# Patient Record
Sex: FEMALE | Race: BLACK OR AFRICAN AMERICAN | NOT HISPANIC OR LATINO | Employment: UNEMPLOYED | URBAN - METROPOLITAN AREA
[De-identification: names, ages, dates, MRNs, and addresses within clinical notes are randomized per-mention and may not be internally consistent; named-entity substitution may affect disease eponyms.]

---

## 2024-09-02 NOTE — PROGRESS NOTES
[facility] Clinic Note    CHIEF COMPLAINT:  Chief Complaint   Patient presents with    Establish Care     C/o lump on RIGHT arm, painful only when lifting something heavy       HISTORY OF  PRESENT ILLNESS:  Farzaneh Root is a 24 y.o. female who presents to clinic today to establish primary care. She recently moved from [place] with her  and 3 kids who are also established at [facility].   LMP - 8/8/2024. Regular. Lasts for 7 days.  Empties menstrual cup 3-4 times daily.   Sexually active with . Uses protection occasionally. Not interested in contraceptive methods at this time.       PMH - Asthma (well controlled, no recent flares)  PSH - Hernia surgery at age 3/4, C- section 2 years ago  FH - HTN in mom and grandparents. Lung cancer in aunt.   Social history -  with 3 kids. Works at [employer]. Denies smoking or recreational drug use. Drinks alcohol socially.  Medications - none   NKDA      Acute issues:  1) Lump on right forearm  First noticed about 2 years ago  Reports that it has grown in size since initially noticed  Painful whenever she picks her children up with that arm.   Denies any fever, chills, N/V, discharge from area      Health Maintenance: Requesting medical records from previous PCP  Cervical Cancer Screen: Pt reports done a year ago  Immunizations:         History reviewed. No pertinent past medical history. History reviewed. No pertinent surgical history.   Social History     Socioeconomic History    Marital status:    Tobacco Use    Smoking status: Never    Smokeless tobacco: Never     Social Determinants of Health     Financial Resource Strain: Medium Risk (9/2/2024)    Overall Financial Resource Strain (CARDIA)     Difficulty of Paying Living Expenses: Somewhat hard   Food Insecurity: Food Insecurity Present (9/2/2024)    Hunger Vital Sign     Worried About Running Out of Food in the Last Year: Sometimes true     Ran Out of Food in the Last Year: Patient  "declined   Physical Activity: Sufficiently Active (9/2/2024)    Exercise Vital Sign     Days of Exercise per Week: 7 days     Minutes of Exercise per Session: 60 min   Stress: Stress Concern Present (9/2/2024)    [employer] [place] [employer] - Occupational Stress Questionnaire     Feeling of Stress : Very much   Housing Stability: Unknown (9/2/2024)    Housing Stability Vital Sign     Unable to Pay for Housing in the Last Year: No         PHYSICAL EXAMINATION:  Blood pressure 125/82, pulse 95, temperature 98 °F (36.7 °C), temperature source Oral, resp. rate 18, height 5' 5" (1.651 m), weight 63 kg (139 lb), SpO2 98%.    General:  VSS. Pleasant. No acute distress.   Respiratory: No increased work of breathing. Lungs clear to ascultation in all lung fields  Cardiovascular:  RRR, no additional heart sounds heard.  Skin- 2.5 -3cm soft, moveable, slightly raised, round lesion on right anterior upper arm. No surrounding erythema or drainage from lesion. Non tender to palpation.   Musculoskeletal:  Full range of motion of all extremities  Neurologic:  A&O X 3.    Psychiatric:  Calm, cooperative. Mood and effect normal.  Responses appropriate.              ASSESSMENT/PLAN:    1) Encounter to establish primary care  Requesting medical records from previous PCP and Gynecologist  Routine labs ordered - CBC, CMP, Lipid panel, Hep C, HIV, A1C, TSH, Vitamin D      2) ?Epidermoid cyst vs Lipoma  U/S of lesion ordered  Sent referral to Minor Procedures clinic         Tammi Rocha M.D  [facility] Family Medicine Resident, CHERYL-II  "

## 2024-09-03 ENCOUNTER — OFFICE VISIT (OUTPATIENT)
Dept: FAMILY MEDICINE | Facility: CLINIC | Age: 24
End: 2024-09-03
Payer: COMMERCIAL

## 2024-09-03 VITALS
HEART RATE: 95 BPM | RESPIRATION RATE: 18 BRPM | WEIGHT: 139 LBS | HEIGHT: 65 IN | SYSTOLIC BLOOD PRESSURE: 125 MMHG | OXYGEN SATURATION: 98 % | BODY MASS INDEX: 23.16 KG/M2 | DIASTOLIC BLOOD PRESSURE: 82 MMHG | TEMPERATURE: 98 F

## 2024-09-03 DIAGNOSIS — Z76.89 ENCOUNTER TO ESTABLISH CARE: Primary | ICD-10-CM

## 2024-09-03 DIAGNOSIS — L72.0 EPIDERMOID CYST OF SKIN: ICD-10-CM

## 2024-09-03 LAB
25(OH)D3+25(OH)D2 SERPL-MCNC: 19 NG/ML (ref 30–80)
ALBUMIN SERPL-MCNC: 3.9 G/DL (ref 3.5–5)
ALBUMIN/GLOB SERPL: 1 RATIO (ref 1.1–2)
ALP SERPL-CCNC: 45 UNIT/L (ref 40–150)
ALT SERPL-CCNC: 15 UNIT/L (ref 0–55)
ANION GAP SERPL CALC-SCNC: 9 MEQ/L
AST SERPL-CCNC: 21 UNIT/L (ref 5–34)
BASOPHILS # BLD AUTO: 0.06 X10(3)/MCL
BASOPHILS NFR BLD AUTO: 1 %
BILIRUB SERPL-MCNC: 0.3 MG/DL
BUN SERPL-MCNC: 10.3 MG/DL (ref 7–18.7)
CALCIUM SERPL-MCNC: 9.8 MG/DL (ref 8.4–10.2)
CHLORIDE SERPL-SCNC: 106 MMOL/L (ref 98–107)
CHOLEST SERPL-MCNC: 129 MG/DL
CHOLEST/HDLC SERPL: 4 {RATIO} (ref 0–5)
CO2 SERPL-SCNC: 26 MMOL/L (ref 22–29)
CREAT SERPL-MCNC: 0.83 MG/DL (ref 0.55–1.02)
CREAT/UREA NIT SERPL: 12
EOSINOPHIL # BLD AUTO: 0.11 X10(3)/MCL (ref 0–0.9)
EOSINOPHIL NFR BLD AUTO: 1.9 %
ERYTHROCYTE [DISTWIDTH] IN BLOOD BY AUTOMATED COUNT: 14.5 % (ref 11.5–17)
EST. AVERAGE GLUCOSE BLD GHB EST-MCNC: 105.4 MG/DL
GFR SERPLBLD CREATININE-BSD FMLA CKD-EPI: >60 ML/MIN/1.73/M2
GLOBULIN SER-MCNC: 4 GM/DL (ref 2.4–3.5)
GLUCOSE SERPL-MCNC: 84 MG/DL (ref 74–100)
HBA1C MFR BLD: 5.3 %
HCT VFR BLD AUTO: 39.7 % (ref 37–47)
HCV AB SERPL QL IA: NONREACTIVE
HDLC SERPL-MCNC: 34 MG/DL (ref 35–60)
HGB BLD-MCNC: 12.3 G/DL (ref 12–16)
HIV 1+2 AB+HIV1 P24 AG SERPL QL IA: NONREACTIVE
IMM GRANULOCYTES # BLD AUTO: 0.02 X10(3)/MCL (ref 0–0.04)
IMM GRANULOCYTES NFR BLD AUTO: 0.3 %
LDLC SERPL CALC-MCNC: 66 MG/DL (ref 50–140)
LYMPHOCYTES # BLD AUTO: 2.21 X10(3)/MCL (ref 0.6–4.6)
LYMPHOCYTES NFR BLD AUTO: 37.2 %
MCH RBC QN AUTO: 21.7 PG (ref 27–31)
MCHC RBC AUTO-ENTMCNC: 31 G/DL (ref 33–36)
MCV RBC AUTO: 70.1 FL (ref 80–94)
MONOCYTES # BLD AUTO: 0.49 X10(3)/MCL (ref 0.1–1.3)
MONOCYTES NFR BLD AUTO: 8.2 %
NEUTROPHILS # BLD AUTO: 3.05 X10(3)/MCL (ref 2.1–9.2)
NEUTROPHILS NFR BLD AUTO: 51.4 %
NRBC BLD AUTO-RTO: 0 %
PLATELET # BLD AUTO: 340 X10(3)/MCL (ref 130–400)
PMV BLD AUTO: 11.1 FL (ref 7.4–10.4)
POTASSIUM SERPL-SCNC: 4.2 MMOL/L (ref 3.5–5.1)
PROT SERPL-MCNC: 7.9 GM/DL (ref 6.4–8.3)
RBC # BLD AUTO: 5.66 X10(6)/MCL (ref 4.2–5.4)
SODIUM SERPL-SCNC: 141 MMOL/L (ref 136–145)
TRIGL SERPL-MCNC: 143 MG/DL (ref 37–140)
TSH SERPL-ACNC: 0.89 UIU/ML (ref 0.35–4.94)
VLDLC SERPL CALC-MCNC: 29 MG/DL
WBC # BLD AUTO: 5.94 X10(3)/MCL (ref 4.5–11.5)

## 2024-09-03 PROCEDURE — 82306 VITAMIN D 25 HYDROXY: CPT

## 2024-09-03 PROCEDURE — 85025 COMPLETE CBC W/AUTO DIFF WBC: CPT

## 2024-09-03 PROCEDURE — 83036 HEMOGLOBIN GLYCOSYLATED A1C: CPT

## 2024-09-03 PROCEDURE — 99204 OFFICE O/P NEW MOD 45 MIN: CPT | Mod: PBBFAC

## 2024-09-03 PROCEDURE — 80061 LIPID PANEL: CPT

## 2024-09-03 PROCEDURE — 36415 COLL VENOUS BLD VENIPUNCTURE: CPT

## 2024-09-03 PROCEDURE — 87389 HIV-1 AG W/HIV-1&-2 AB AG IA: CPT

## 2024-09-03 PROCEDURE — 84443 ASSAY THYROID STIM HORMONE: CPT

## 2024-09-03 PROCEDURE — 80053 COMPREHEN METABOLIC PANEL: CPT

## 2024-09-03 PROCEDURE — 86803 HEPATITIS C AB TEST: CPT

## 2024-09-05 ENCOUNTER — PATIENT MESSAGE (OUTPATIENT)
Dept: FAMILY MEDICINE | Facility: CLINIC | Age: 24
End: 2024-09-05
Payer: COMMERCIAL

## 2024-10-24 ENCOUNTER — OFFICE VISIT (OUTPATIENT)
Dept: FAMILY MEDICINE | Facility: CLINIC | Age: 24
End: 2024-10-24
Payer: COMMERCIAL

## 2024-10-24 VITALS
WEIGHT: 137.63 LBS | DIASTOLIC BLOOD PRESSURE: 67 MMHG | BODY MASS INDEX: 22.93 KG/M2 | OXYGEN SATURATION: 99 % | TEMPERATURE: 98 F | HEART RATE: 91 BPM | HEIGHT: 65 IN | SYSTOLIC BLOOD PRESSURE: 107 MMHG

## 2024-10-24 DIAGNOSIS — S81.812A LACERATION OF LEFT LOWER EXTREMITY, INITIAL ENCOUNTER: Primary | ICD-10-CM

## 2024-10-24 PROCEDURE — 99213 OFFICE O/P EST LOW 20 MIN: CPT | Mod: PBBFAC

## 2024-10-24 RX ORDER — MUPIROCIN 20 MG/G
OINTMENT TOPICAL 3 TIMES DAILY
Qty: 30 G | Refills: 0 | Status: SHIPPED | OUTPATIENT
Start: 2024-10-24
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
## 2024-10-24 NOTE — LETTER
October 24, 2024    Farzaneh Root  [address]  [address]  [facility] [address]             [facility]  Family Medicine  [address]  [facility] [address]  Phone: [phone]   October 24, 2024     Patient: Farzaneh Root   YOB: 2000   Date of Visit: 10/24/2024       To Whom it May Concern:    Farzaneh Root was seen in my clinic on 10/24/2024. She may return to work on 10/25/24 .    Please excuse her from any classes or work missed.    If you have any questions or concerns, please don't hesitate to call.    Sincerely,         Tammi Rocha MD

## 2024-11-26 ENCOUNTER — OFFICE VISIT (OUTPATIENT)
Dept: FAMILY MEDICINE | Facility: CLINIC | Age: 24
End: 2024-11-26
Payer: COMMERCIAL

## 2024-11-26 VITALS
HEART RATE: 75 BPM | HEIGHT: 65 IN | WEIGHT: 134 LBS | RESPIRATION RATE: 18 BRPM | BODY MASS INDEX: 22.33 KG/M2 | OXYGEN SATURATION: 99 % | TEMPERATURE: 99 F | DIASTOLIC BLOOD PRESSURE: 77 MMHG | SYSTOLIC BLOOD PRESSURE: 121 MMHG

## 2024-11-26 DIAGNOSIS — M79.89 SOFT TISSUE MASS: Primary | ICD-10-CM

## 2024-11-26 DIAGNOSIS — Z76.89 ENCOUNTER TO ESTABLISH CARE: ICD-10-CM

## 2024-11-26 PROCEDURE — 99214 OFFICE O/P EST MOD 30 MIN: CPT | Mod: PBBFAC

## 2024-11-26 NOTE — LETTER
November 26, 2024      [facility] [address] [facility] [address]  Phone: [phone]       Patient: Farzaneh Root   YOB: 2000  Date of Visit: 11/26/2024    To Whom It May Concern:    Hiren Root  was at [facility] on 11/26/2024. The patient may return to work/school on 11-27-24 with no restrictions. If you have any questions or concerns, or if I can be of further assistance, please do not hesitate to contact me.    Sincerely,    Juli Brock MD

## 2024-11-26 NOTE — PROGRESS NOTES
"[facility] FM Clinic Minor Surgery Note    ID:  Farzaneh Root   MRN:  [id number]     11/26/2024    Chief Complaint:  Right bicep lump     History of Present Illness:  Farzaneh Root is a right-handed 24 y.o. female who presents to [facility] FM clinic for evaluation of a lump on her right bicep that has been there since 2022 and she reports as growing in size. Not painful at rest but when holding objects for extended period of time she feels some discomfort. Originally evaluated in FM clinic 09/03/24 and referred for US but pt states she did not attend appointment.     Review of patient's allergies indicates:  No Known Allergies    Review of Systems:  As per HPI    Physical Exam:  /77 (Patient Position: Sitting)   Pulse 75   Temp 98.6 °F (37 °C)   Resp 18   Ht 5' 4.96" (1.65 m)   Wt 60.8 kg (134 lb)   LMP 11/22/2024   SpO2 99%   BMI 22.33 kg/m²   Physical Exam  Vitals reviewed.   Constitutional:       General: She is not in acute distress.     Appearance: Normal appearance.      Comments: Sitting next to  pleasant affect cooperative   Musculoskeletal:      Comments: Right Bicep: no erythema, or discharge. Visible swelling (slight bulge measuring approx 2cm x 3cm) over area. Soft rubbery consistency. Mass mobile and slips easily under fingers. Non-tender to palpation (see image)   Neurological:      General: No focal deficit present.      Mental Status: She is alert and oriented to person, place, and time.      Motor: Motor function is intact. No weakness.            Assessment/Plan:    Problem #1: Soft tissue mass  Likely lipoma with ddx including cyst or Fibroma; however these are less likely as patient's mass is not firm and without punctum expected with cyst and mobile and not attached to skin or deeper structures as expected with fibroma. To assess vasculature and true dimensions an US is recommended. Pt would like to wait for US results before removing.    -US referral still in system.    -Pt is " an instructor and requires lifting heavy objects. Will need to schedule any procedure at     time when she will be able to take a few days off from work.       Follow up 1-3 months for lipoma on arm.    Dread Segovia MD  [facility] FM, HO-I

## 2025-04-22 ENCOUNTER — OFFICE VISIT (OUTPATIENT)
Dept: FAMILY MEDICINE | Facility: CLINIC | Age: 25
End: 2025-04-22
Payer: COMMERCIAL

## 2025-04-22 VITALS
HEIGHT: 64 IN | HEART RATE: 81 BPM | WEIGHT: 130.19 LBS | DIASTOLIC BLOOD PRESSURE: 76 MMHG | TEMPERATURE: 98 F | SYSTOLIC BLOOD PRESSURE: 133 MMHG | OXYGEN SATURATION: 100 % | BODY MASS INDEX: 22.23 KG/M2

## 2025-04-22 DIAGNOSIS — Z11.3 SCREEN FOR STD (SEXUALLY TRANSMITTED DISEASE): ICD-10-CM

## 2025-04-22 DIAGNOSIS — Z12.4 PAP SMEAR FOR CERVICAL CANCER SCREENING: Primary | ICD-10-CM

## 2025-04-22 DIAGNOSIS — E55.9 VITAMIN D DEFICIENCY: ICD-10-CM

## 2025-04-22 LAB
C TRACH DNA SPEC QL NAA+PROBE: NOT DETECTED
CLUE CELLS VAG QL WET PREP: ABNORMAL
N GONORRHOEA DNA SPEC QL NAA+PROBE: NOT DETECTED
SOURCE (OHS): NORMAL
T VAGINALIS VAG QL WET PREP: ABNORMAL
WBC #/AREA VAG WET PREP: ABNORMAL
YEAST SPEC QL WET PREP: ABNORMAL

## 2025-04-22 PROCEDURE — 87491 CHLMYD TRACH DNA AMP PROBE: CPT

## 2025-04-22 PROCEDURE — 88174 CYTOPATH C/V AUTO IN FLUID: CPT

## 2025-04-22 PROCEDURE — 99213 OFFICE O/P EST LOW 20 MIN: CPT | Mod: PBBFAC

## 2025-04-22 PROCEDURE — 87210 SMEAR WET MOUNT SALINE/INK: CPT

## 2025-04-22 NOTE — PROGRESS NOTES
"[facility] Clinic Note    CHIEF COMPLAINT:  Chief Complaint   Patient presents with    Annual Exam       HISTORY OF  PRESENT ILLNESS:  Farzaneh Root is a 24 y.o. female who presents to clinic today for annual visit. No concerns reported today. Denies recent illnesses. Denies fever, chills, HA, dizziness, SOB, chest pain, abdominal pain, changes in BM, vaginal discharge.    LMP 3/31/2025, regular.   Contraception - not interested in any birth control methods at this time. Sexually active with her .      Health Maintenance:  Cervical Cancer Screen: due today  Immunizations: Requesting immunization records      Past Medical History:  - Vitamin D deficiency: OTC supplements        PHYSICAL EXAMINATION:  Vitals:    04/22/25 0854   BP: 133/76   BP Location: Right arm   Patient Position: Sitting   Pulse: 81   Temp: 98.2 °F (36.8 °C)   TempSrc: Oral   SpO2: 100%   Weight: 59.1 kg (130 lb 3.2 oz)   Height: 5' 4" (1.626 m)       General:  VSS. Pleasant. No acute distress.   Respiratory: No increased work of breathing  Cardiovascular:  RRR, no additional heart sounds heard.  : Vaginal Exam: No inguinal lymphadenopathy on palpation. No vulvar erythema or lesions. Vaginal mucosa without atrophy, erythema. Physiologic discharge present. Cervix well visualized without lesion or erythema. No cervical motion tenderness to palpation. No adnexal masses or tenderness to adnexal palpation. Chaperone present for  exam.  Musculoskeletal:  Full range of motion of all extremities  Neurologic:  A&O X 3.         ASSESSMENT/PLAN:    1) Vitamin D deficiency  Counseled on the use of OTC vitamin D supplements.      2) Pap smear due  Collected today      3) STD screening  Requesting routine GC/CT and wet prep today. Declines HIV, RPR and Hep panel.   GC/CT and wet prep collected        RTC in 8-10 months, or sooner if needed.       Tammi Rocha M.D  [facility] Family Medicine Resident, HO-II       "

## 2025-07-01 ENCOUNTER — OFFICE VISIT (OUTPATIENT)
Dept: FAMILY MEDICINE | Facility: CLINIC | Age: 25
End: 2025-07-01
Payer: COMMERCIAL

## 2025-07-01 VITALS
HEART RATE: 89 BPM | BODY MASS INDEX: 21.82 KG/M2 | TEMPERATURE: 98 F | OXYGEN SATURATION: 100 % | HEIGHT: 64 IN | RESPIRATION RATE: 18 BRPM | WEIGHT: 127.81 LBS

## 2025-07-01 DIAGNOSIS — N94.3 PRE-MENSTRUAL SYNDROME: Primary | ICD-10-CM

## 2025-07-01 DIAGNOSIS — E55.9 VITAMIN D DEFICIENCY: ICD-10-CM

## 2025-07-01 DIAGNOSIS — Z11.3 ROUTINE SCREENING FOR STI (SEXUALLY TRANSMITTED INFECTION): ICD-10-CM

## 2025-07-01 LAB
25(OH)D3+25(OH)D2 SERPL-MCNC: 24 NG/ML (ref 30–80)
C TRACH DNA SPEC QL NAA+PROBE: NOT DETECTED
CLUE CELLS VAG QL WET PREP: ABNORMAL
N GONORRHOEA DNA SPEC QL NAA+PROBE: NOT DETECTED
SPECIMEN SOURCE: NORMAL
T PALLIDUM AB SER QL: NONREACTIVE
T VAGINALIS VAG QL WET PREP: ABNORMAL
T4 FREE SERPL-MCNC: 1 NG/DL (ref 0.7–1.48)
TSH SERPL-ACNC: 1.26 UIU/ML (ref 0.35–4.94)
WBC #/AREA VAG WET PREP: ABNORMAL
YEAST SPEC QL WET PREP: ABNORMAL

## 2025-07-01 PROCEDURE — 84443 ASSAY THYROID STIM HORMONE: CPT

## 2025-07-01 PROCEDURE — 84439 ASSAY OF FREE THYROXINE: CPT

## 2025-07-01 PROCEDURE — 86780 TREPONEMA PALLIDUM: CPT

## 2025-07-01 PROCEDURE — 87591 N.GONORRHOEAE DNA AMP PROB: CPT

## 2025-07-01 PROCEDURE — 87210 SMEAR WET MOUNT SALINE/INK: CPT

## 2025-07-01 PROCEDURE — 82306 VITAMIN D 25 HYDROXY: CPT

## 2025-07-01 PROCEDURE — 99214 OFFICE O/P EST MOD 30 MIN: CPT | Mod: PBBFAC

## 2025-07-01 RX ORDER — FLUOXETINE 10 MG/1
10 CAPSULE ORAL DAILY
Qty: 30 CAPSULE | Refills: 11 | Status: SHIPPED | OUTPATIENT
Start: 2025-07-01 | End: 2026-07-01

## 2025-07-01 NOTE — PROGRESS NOTES
"[facility] OFFICE VISIT NOTE  Farzaneh Root  [id number]  2025    Chief Complaint   Patient presents with    Follow-up     Patient having too frequent of periods with roller coaster ter emotions.       HPI  Farzaneh Root is a 24 y.o. female  presenting to [facility] for menstrual complaints. Reports mood swings and cramps before the period starts with acne breakout. Has only noticed it recently but reports experiencing this for a year. Reports feeling fatigue, mood swings, irritability, anxiety, headaches, hot flashes, dizziness all one week prior to period starting. Symptoms improve after menses starts. Denies suicidal ideation or homicidal ideation.       Gyn Hx: menarche at age 12, LMP 2025, reports regular periods, has decreased interval since postpartum in , used 28 day cycle now 18-21 day cycles, regular bleeding with no debilitating pelvic pain, bleeds for 6 days using 3-4 menstrual cups and 2-3 pads daily, denies hx of birth control, hx of chlamydia in  was treated, denies hx of abnormal pap smears    OB Hx: , twins in  at 34-35 weeks, first was  and twins were  2/2 to fetal malposition, twins with NICU stay.     Surg hx: , umbilical hernia repair as child    Review of System  Pertinent review of system noted in HPI above    Current Medications:   Current Outpatient Medications   Medication Instructions    FLUoxetine 10 mg, Oral, Daily       Pulse 89, temperature 98 °F (36.7 °C), temperature source Oral, resp. rate 18, height 5' 4.02" (1.626 m), weight 58 kg (127 lb 12.8 oz), last menstrual period 2025, SpO2 100%.      exam completed with nurse chaperone Maryam in room    Physical Exam:  General: Appears comfortable  HEENT: NC/AT  CVS: Regular rhythm. Normal S1/S2.  Pulmonary: clear to auscultation bilaterally, no wheezing  Abdomen: nondistended, normoactive BS, soft and non-tender  :external vagina normal, no labia or vaginal os lesions or acute " vaginal discharge, speculum exam performed with visualization of cervix with no acute cervical lesions with pathologic vaginal discharge. Wet prep and GC/CT swab obtained.   MSK: No obvious deformity or joint swelling  Skin: Warm and dry  Neuro: AAOx3, no focal neurological deficit. CN II-XII grossly intact   Psych: Cooperative        Assessment:   1. Pre-menstrual syndrome    2. Vitamin D deficiency    3. Routine screening for STI (sexually transmitted infection)        Plan:  Pre-menstrual syndrome  -symptoms prior to menstrual cycle, improves after menses starts  -possible PMDD/PMS   -will obtain TSH and T4  -patient does not want to start OCPs at this time for increased menses, would prefer to start SSRI  -denies hx of jie symptoms, no SI or HI  -initiate Fluoxetine 10mg daily, will up titrate to 20mg at next visit   - GAD7:10 and PHQ9: 7  -patient reports being amenable for trial of OCPs after exhausting other forms of treatment    Vitamin D deficiency  -hx of vitamin D deficiency, repeat vitamin D level    Screening for STI  -reports one sexual partner, requesting STD testing  -Syphilis AB with RPR, GC/CT swab and Wet prep  -NR HIV  09/2024    Orders Placed This Encounter    Chlamydia/GC, PCR    Wet Prep, Genital    TSH    T4, Free    Vitamin D    SYPHILIS ANTIBODY (WITH REFLEX RPR)    FLUoxetine 10 MG capsule       Return to clinic in 4 weeks for SSRI follow up, or sooner if needed.     Dayna Atkinson  [facility] Resident HO-2

## 2025-07-01 NOTE — LETTER
July 1, 2025      [facility]  [facility] [facility] [address]  Phone: [phone]       Patient: Farzaneh Root   YOB: 2000  Date of Visit: 07/01/2025    To Whom It May Concern:    Hiren Root  was at [facility] on 07/01/2025. The patient may return to work/school on 07/02/2025 with no restrictions. If you have any questions or concerns, or if I can be of further assistance, please do not hesitate to contact me.    Sincerely,    Comfort Armendariz LPN

## 2025-07-02 NOTE — PROGRESS NOTES
I have discussed the case with the resident and reviewed the resident's history and physical, assessment, plan, and progress note. I agree with the findings.       Buck Cheng MD  [facility]

## 2025-07-03 ENCOUNTER — TELEPHONE (OUTPATIENT)
Dept: FAMILY MEDICINE | Facility: CLINIC | Age: 25
End: 2025-07-03
Payer: COMMERCIAL

## 2025-07-03 NOTE — TELEPHONE ENCOUNTER
Called patient, identified with  and name. Went over recent test results of Vitamin D, thyroid, and STI swabs/test. Vitamin D mildly low at 24, recommended OTC supplementation. Has not started Fluoxetine yet, will start today. Patient verbalized understanding and all questions and concerns addressed.

## 2025-07-14 ENCOUNTER — OFFICE VISIT (OUTPATIENT)
Dept: URGENT CARE | Facility: CLINIC | Age: 25
End: 2025-07-14
Payer: COMMERCIAL

## 2025-07-14 VITALS
OXYGEN SATURATION: 98 % | HEART RATE: 89 BPM | TEMPERATURE: 98 F | HEIGHT: 64 IN | WEIGHT: 130.19 LBS | RESPIRATION RATE: 20 BRPM | SYSTOLIC BLOOD PRESSURE: 122 MMHG | DIASTOLIC BLOOD PRESSURE: 85 MMHG | BODY MASS INDEX: 22.23 KG/M2

## 2025-07-14 DIAGNOSIS — J40 BRONCHITIS: Primary | ICD-10-CM

## 2025-07-14 DIAGNOSIS — J06.9 VIRAL URI WITH COUGH: ICD-10-CM

## 2025-07-14 PROCEDURE — 99214 OFFICE O/P EST MOD 30 MIN: CPT | Mod: PBBFAC | Performed by: NURSE PRACTITIONER

## 2025-07-14 RX ORDER — PREDNISONE 20 MG/1
20 TABLET ORAL 2 TIMES DAILY
Qty: 10 TABLET | Refills: 0 | Status: SHIPPED | OUTPATIENT
Start: 2025-07-14 | End: 2025-07-19

## 2025-07-14 RX ORDER — ALBUTEROL SULFATE 90 UG/1
2 INHALANT RESPIRATORY (INHALATION) EVERY 6 HOURS PRN
Qty: 1 G | Refills: 0 | Status: SHIPPED | OUTPATIENT
Start: 2025-07-14

## 2025-07-14 RX ORDER — PROMETHAZINE HYDROCHLORIDE AND DEXTROMETHORPHAN HYDROBROMIDE 6.25; 15 MG/5ML; MG/5ML
10 SYRUP ORAL
Qty: 120 ML | Refills: 0 | Status: SHIPPED | OUTPATIENT
Start: 2025-07-14

## 2025-07-14 RX ORDER — FLUTICASONE PROPIONATE 50 MCG
2 SPRAY, SUSPENSION (ML) NASAL DAILY
Qty: 18.2 ML | Refills: 0 | Status: SHIPPED | OUTPATIENT
Start: 2025-07-14

## 2025-07-14 RX ORDER — BENZONATATE 200 MG/1
200 CAPSULE ORAL 3 TIMES DAILY PRN
Qty: 30 CAPSULE | Refills: 0 | Status: SHIPPED | OUTPATIENT
Start: 2025-07-14

## 2025-07-15 NOTE — PATIENT INSTRUCTIONS
Please follow instructions on patient education material.      Return to urgent care in 2 to 3 days if symptoms are not improving, immediately if you develop any new or worsening symptoms.       Bronchitis cough can last 4-6 weeks.  - nasal saline lavage  - OTC cold/flu products as desired for symptoms  - Humidified air  - Tylenol or Motrin for pain/fever  -Drink plenty of water.   -Do not take complete bedrest.     Go to the ER if you experience chest pain with shortness of breath, shortness of breath when moving around your house, high fevers 103.0+, excessive vomiting/diarrhea, or general distress.

## 2025-07-15 NOTE — PROGRESS NOTES
"Subjective:      Patient ID: Farzaneh Root is a 24 y.o. female.    Vitals:  height is 5' 4" (1.626 m) and weight is 59.1 kg (130 lb 3.2 oz). Her oral temperature is 98.1 °F (36.7 °C). Her blood pressure is 122/85 and her pulse is 89. Her respiration is 20 and oxygen saturation is 98%.     Chief Complaint: URI (Pt co cough, PND, fatigue  x 3 weeks )    URI      as stated in chief complaint.  Patient reports wet cough that is nonproductive.  Patient reports known history of asthma.  Patient states she does not have a rescue inhaler has not had to use on for years no recent hospitalization.  Patient denies fever, headache, weakness, dizziness sore throat shortness for breath or chest pain, dizziness, weakness, stomach pain, nausea or vomiting  ROS   Objective:     Physical Exam   Constitutional: She is oriented to person, place, and time. She appears well-developed. She is cooperative.  Non-toxic appearance. She does not appear ill. No distress. awake  HENT:   Head: Normocephalic and atraumatic.   Ears:   Right Ear: Tympanic membrane normal.   Left Ear: Tympanic membrane normal.   Nose: Rhinorrhea and congestion present. No purulent discharge. Right sinus exhibits no maxillary sinus tenderness and no frontal sinus tenderness. Left sinus exhibits no maxillary sinus tenderness and no frontal sinus tenderness.   Mouth/Throat: Uvula is midline. Mucous membranes are moist. No oropharyngeal exudate or posterior oropharyngeal erythema.   Eyes: Conjunctivae are normal. Right eye exhibits no discharge. Left eye exhibits no discharge. Extraocular movement intact   Neck: Neck supple. No neck rigidity present.   Cardiovascular: Normal rate, regular rhythm and normal pulses.   Pulmonary/Chest: Effort normal and breath sounds normal. No stridor. No respiratory distress. She has no wheezes. She has no rhonchi. She has no rales. She exhibits no tenderness.   Abdominal: Normal appearance and bowel sounds are normal. Soft. "   Musculoskeletal:      Cervical back: She exhibits no tenderness.   Lymphadenopathy:     She has no cervical adenopathy.   Neurological: She is alert and oriented to person, place, and time.   Skin: Skin is warm, dry and not diaphoretic.   Psychiatric: Her behavior is normal. Mood, judgment and thought content normal.   Nursing note and vitals reviewed.      Assessment:     1. Bronchitis    2. Viral URI with cough        Plan:   ER precautions given and discussed  Prescription management for cough with suppressant and expectorant  Even short course of steroids for bronchial inflammation with known history of asthma.  - nasal saline lavage  - OTC cold/flu products as desired for symptoms  - Humidified air  - Tylenol or Motrin for pain/fever  -Drink plenty of water.   -Do not take complete bedrest.   Bronchitis  -     promethazine-dextromethorphan (PROMETHAZINE-DM) 6.25-15 mg/5 mL Syrp; Take 10 mLs by mouth every 6 to 8 hours as needed (Cough). May cause sedation.  Do not drive or operate heavy machinery after taking this medication.  Dispense: 120 mL; Refill: 0  -     benzonatate (TESSALON) 200 MG capsule; Take 1 capsule (200 mg total) by mouth 3 (three) times daily as needed for Cough.  Dispense: 30 capsule; Refill: 0  -     albuterol (PROVENTIL HFA) 90 mcg/actuation inhaler; Inhale 2 puffs into the lungs every 6 (six) hours as needed for Wheezing or Shortness of Breath. Rescue  Dispense: 1 g; Refill: 0  -     predniSONE (DELTASONE) 20 MG tablet; Take 1 tablet (20 mg total) by mouth 2 (two) times daily. for 5 days  Dispense: 10 tablet; Refill: 0  -     fluticasone propionate (FLONASE) 50 mcg/actuation nasal spray; 2 sprays (100 mcg total) by Each Nostril route once daily.  Dispense: 18.2 mL; Refill: 0    Viral URI with cough

## 2025-08-04 ENCOUNTER — OFFICE VISIT (OUTPATIENT)
Dept: FAMILY MEDICINE | Facility: CLINIC | Age: 25
End: 2025-08-04
Payer: COMMERCIAL

## 2025-08-04 VITALS
WEIGHT: 130.63 LBS | BODY MASS INDEX: 22.3 KG/M2 | RESPIRATION RATE: 20 BRPM | TEMPERATURE: 98 F | HEIGHT: 64 IN | HEART RATE: 106 BPM | DIASTOLIC BLOOD PRESSURE: 80 MMHG | OXYGEN SATURATION: 100 % | SYSTOLIC BLOOD PRESSURE: 126 MMHG

## 2025-08-04 DIAGNOSIS — L70.9 ACNE, UNSPECIFIED ACNE TYPE: ICD-10-CM

## 2025-08-04 DIAGNOSIS — M79.674 PAIN OF TOE OF RIGHT FOOT: ICD-10-CM

## 2025-08-04 DIAGNOSIS — N93.9 ABNORMAL UTERINE BLEEDING (AUB): Primary | ICD-10-CM

## 2025-08-04 PROCEDURE — 99214 OFFICE O/P EST MOD 30 MIN: CPT | Mod: PBBFAC

## 2025-08-04 PROCEDURE — 81025 URINE PREGNANCY TEST: CPT | Mod: PBBFAC

## 2025-08-04 NOTE — LETTER
August 4, 2025      [facility] [address] [place] [address]  Phone: [phone]       Patient: Farzaneh Root   YOB: 2000  Date of Visit: 08/04/2025    To Whom It May Concern:    Hiren Root  was at [facility] on 08/04/2025. The patient may return to work/school on 8/5/2025 with no restrictions. If you have any questions or concerns, or if I can be of further assistance, please do not hesitate to contact me.    Sincerely,    Richard Boggs MA

## 2025-08-04 NOTE — PROGRESS NOTES
[facility] Clinic Note    CHIEF COMPLAINT:  Chief Complaint   Patient presents with    Follow-up     4 Week Follow Up. C/o right foot big toe pain, rash on upper shoulders and back. Patient would like to discuss medication for menstrual cycle       HISTORY OF  PRESENT ILLNESS:  Farzaneh Root is a 25 y.o. female who presents to clinic today for medication follow up.    - [place] [facility] [place]  - Upon chart review, she was prescribed Fluoxetine 10mg QD for possible PMDD/PMS mood symptoms.  - She reports that she never started taking medication and does not want to be on an SSRI.   - She denies any mood disturbances but states that her major concern is that her cycles have been occurring more frequently in the past 1 year or so.  - Cycles occur every 18-21 days and last 7 days. She reports that she uses menstrual cups and empties them about 3 times daily as well as uses about 3 pads daily. This is normal for her. She denies any intermenstrual spotting. Reports some pelvic cramping only on day 1 and 2 of period that does not affect daily activities.  - Upon chart review, she was offered OCPs at last visit but declined.  - LMP 7/31/2025, prior to that, last period was on 7/6/2025.   - Denies SI/HI/hallucinations.     Right big toe pain  - Ongoing x 2 days after wearing tight shoes for a long period of time.  - Denies any swelling or erythema to area, fever, chills, N/V    Acne on upper shoulders and back  - Noticed few weeks ago and has never tried anything for acne in past.      Health Maintenance:  Cervical Cancer Screen: 4/2025 - NIL      Current Outpatient Medications   Medication Instructions    albuterol (PROVENTIL HFA) 90 mcg/actuation inhaler 2 puffs, Inhalation, Every 6 hours PRN, Rescue    fluticasone propionate (FLONASE) 100 mcg, Each Nostril, Daily         REVIEW OF SYSTEMS:  See HPI      PHYSICAL EXAMINATION:  Vitals:    08/04/25 1451   BP: 126/80   BP Location: Left arm   Patient Position: Sitting  "  Pulse: 106   Resp: 20   Temp: 98 °F (36.7 °C)   TempSrc: Oral   SpO2: 100%   Weight: 59.2 kg (130 lb 9.6 oz)   Height: 5' 4" (1.626 m)       General:  VSS. No acute distress.   Respiratory: no increased work of breathing  Musculoskeletal:  No erythema or swelling noted to right great toe. Full ROM, no skin or bony deformities.   Skin: Few comedones diffusely located on upper back and shoulders.  Neurologic:  A&O X 3.           ASSESSMENT/PLAN:    1. Abnormal uterine bleeding (AUB)  - US Pelvis Complete Non OB ordered for further evaluation  - CBC and CMP also ordered. TSH done 7/14 wnl.  - UPT negative today. However, patient reports recent unprotected sexual intercourse within the last 2 weeks on 7/25. She was amenable to starting COCs at this time as she has no contraindications to it. However, we discussed abstaining from intercourse for at least 2 weeks prior to beginning medication. She verbalized understanding.   - Will have her return in 1-2 weeks and plan to repeat a UPT at that visit prior to sending rx.      2. Acne, unspecified acne type  - Counseled to use body wash with salicylic acid OTC.       3. Pain of toe of right foot  - Counseled to avoid use of tight foot wears. Can use Ibuprofen/Tylenol OTC for pain.  - If continues to persist, consider Xray.       RTC in 1-2 weeks.          Tammi Rocha M.D  [facility] Resident, [facility]    "

## 2025-08-05 LAB
B-HCG UR QL: NEGATIVE
CTP QC/QA: YES

## 2025-08-06 NOTE — PROGRESS NOTES
I reviewed History, PE, A/P and medical record.  Services provided in outpatient department of a teaching hospital/facility, I was immediately available.  I agree with resident, care reasonable and necessary with any exceptions stated below.  I evaluated the patient with resident at time of visit, participated in key parts of H/P and management was discussed.      Vit d low, please follow up at next visit    PAP 4/2025 - no endocervical cells present, consider repeat        Traci Younger MD  [facility] - [facility]

## 2025-08-12 ENCOUNTER — PATIENT MESSAGE (OUTPATIENT)
Dept: FAMILY MEDICINE | Facility: CLINIC | Age: 25
End: 2025-08-12

## 2025-08-12 ENCOUNTER — OFFICE VISIT (OUTPATIENT)
Dept: FAMILY MEDICINE | Facility: CLINIC | Age: 25
End: 2025-08-12
Payer: COMMERCIAL

## 2025-08-12 VITALS
DIASTOLIC BLOOD PRESSURE: 81 MMHG | SYSTOLIC BLOOD PRESSURE: 122 MMHG | HEART RATE: 98 BPM | BODY MASS INDEX: 22.26 KG/M2 | RESPIRATION RATE: 20 BRPM | HEIGHT: 64 IN | WEIGHT: 130.38 LBS | TEMPERATURE: 98 F | OXYGEN SATURATION: 97 %

## 2025-08-12 DIAGNOSIS — E55.9 VITAMIN D DEFICIENCY: ICD-10-CM

## 2025-08-12 DIAGNOSIS — N76.0 BACTERIAL VAGINOSIS: ICD-10-CM

## 2025-08-12 DIAGNOSIS — N93.9 ABNORMAL UTERINE BLEEDING (AUB): Primary | ICD-10-CM

## 2025-08-12 DIAGNOSIS — B96.89 BACTERIAL VAGINOSIS: ICD-10-CM

## 2025-08-12 DIAGNOSIS — N89.8 VAGINAL DISCHARGE: ICD-10-CM

## 2025-08-12 LAB
B-HCG UR QL: NEGATIVE
BACTERIA #/AREA URNS AUTO: ABNORMAL /HPF
BILIRUB UR QL STRIP.AUTO: NEGATIVE
C TRACH DNA SPEC QL NAA+PROBE: NOT DETECTED
CLARITY UR: CLEAR
CLUE CELLS VAG QL WET PREP: ABNORMAL
COLOR UR AUTO: ABNORMAL
CTP QC/QA: YES
GLUCOSE UR QL STRIP: NORMAL
HGB UR QL STRIP: NEGATIVE
HYALINE CASTS #/AREA URNS LPF: ABNORMAL /LPF
KETONES UR QL STRIP: NEGATIVE
LEUKOCYTE ESTERASE UR QL STRIP: NEGATIVE
MUCOUS THREADS URNS QL MICRO: ABNORMAL /LPF
N GONORRHOEA DNA SPEC QL NAA+PROBE: NOT DETECTED
NITRITE UR QL STRIP: NEGATIVE
PH UR STRIP: 7 [PH]
PROT UR QL STRIP: NEGATIVE
RBC #/AREA URNS AUTO: ABNORMAL /HPF
SP GR UR STRIP.AUTO: 1.02 (ref 1–1.03)
SPECIMEN SOURCE: NORMAL
SQUAMOUS #/AREA URNS LPF: ABNORMAL /HPF
T VAGINALIS VAG QL WET PREP: ABNORMAL
UROBILINOGEN UR STRIP-ACNC: ABNORMAL
WBC #/AREA URNS AUTO: ABNORMAL /HPF
WBC #/AREA VAG WET PREP: ABNORMAL
YEAST SPEC QL WET PREP: ABNORMAL

## 2025-08-12 PROCEDURE — 87591 N.GONORRHOEAE DNA AMP PROB: CPT

## 2025-08-12 PROCEDURE — 81025 URINE PREGNANCY TEST: CPT | Mod: PBBFAC

## 2025-08-12 PROCEDURE — 99214 OFFICE O/P EST MOD 30 MIN: CPT | Mod: PBBFAC

## 2025-08-12 PROCEDURE — 87210 SMEAR WET MOUNT SALINE/INK: CPT

## 2025-08-12 PROCEDURE — 81015 MICROSCOPIC EXAM OF URINE: CPT

## 2025-08-12 RX ORDER — METRONIDAZOLE 500 MG/1
500 TABLET ORAL EVERY 12 HOURS
Qty: 14 TABLET | Refills: 0 | Status: SHIPPED | OUTPATIENT
Start: 2025-08-12 | End: 2025-08-19

## 2025-08-12 RX ORDER — NORETHINDRONE ACETATE AND ETHINYL ESTRADIOL .02; 1 MG/1; MG/1
1 TABLET ORAL DAILY
Qty: 30 TABLET | Refills: 11 | Status: SHIPPED | OUTPATIENT
Start: 2025-08-12 | End: 2026-08-12

## 2025-08-12 RX ORDER — ACETAMINOPHEN 500 MG
5000 TABLET ORAL DAILY
Qty: 30 TABLET | Refills: 2 | Status: SHIPPED | OUTPATIENT
Start: 2025-08-12

## 2025-08-19 ENCOUNTER — TELEPHONE (OUTPATIENT)
Dept: PEDIATRICS | Facility: CLINIC | Age: 25
End: 2025-08-19
Payer: COMMERCIAL

## 2025-08-20 ENCOUNTER — PATIENT MESSAGE (OUTPATIENT)
Dept: FAMILY MEDICINE | Facility: CLINIC | Age: 25
End: 2025-08-20
Payer: COMMERCIAL

## 2025-08-21 RX ORDER — FLUCONAZOLE 150 MG/1
150 TABLET ORAL DAILY
Qty: 1 TABLET | Refills: 0 | Status: SHIPPED | OUTPATIENT
Start: 2025-08-21 | End: 2025-08-22